# Patient Record
Sex: MALE | Race: WHITE | URBAN - NONMETROPOLITAN AREA
[De-identification: names, ages, dates, MRNs, and addresses within clinical notes are randomized per-mention and may not be internally consistent; named-entity substitution may affect disease eponyms.]

---

## 2017-01-31 ENCOUNTER — HISTORY (OUTPATIENT)
Dept: INTERNAL MEDICINE | Facility: OTHER | Age: 52
End: 2017-01-31

## 2017-01-31 ENCOUNTER — OFFICE VISIT - GICH (OUTPATIENT)
Dept: INTERNAL MEDICINE | Facility: OTHER | Age: 52
End: 2017-01-31

## 2017-01-31 DIAGNOSIS — K21.9 GASTRO-ESOPHAGEAL REFLUX DISEASE WITHOUT ESOPHAGITIS: ICD-10-CM

## 2017-01-31 DIAGNOSIS — J45.20 MILD INTERMITTENT ASTHMA, UNCOMPLICATED: ICD-10-CM

## 2017-01-31 DIAGNOSIS — N28.89 OTHER SPECIFIED DISORDERS OF KIDNEY AND URETER: ICD-10-CM

## 2017-01-31 DIAGNOSIS — S22.42XA MULTIPLE CLOSED FRACTURES OF RIBS OF LEFT SIDE: ICD-10-CM

## 2017-01-31 DIAGNOSIS — F10.10 UNCOMPLICATED ALCOHOL ABUSE: ICD-10-CM

## 2017-01-31 DIAGNOSIS — L98.8 OTHER SPECIFIED DISORDERS OF THE SKIN AND SUBCUTANEOUS TISSUE: ICD-10-CM

## 2017-02-01 ENCOUNTER — HISTORY (OUTPATIENT)
Dept: UROLOGY | Facility: OTHER | Age: 52
End: 2017-02-01

## 2017-02-01 ENCOUNTER — HOSPITAL ENCOUNTER (OUTPATIENT)
Dept: RADIOLOGY | Facility: OTHER | Age: 52
End: 2017-02-01
Attending: UROLOGY

## 2017-02-01 ENCOUNTER — OFFICE VISIT - GICH (OUTPATIENT)
Dept: UROLOGY | Facility: OTHER | Age: 52
End: 2017-02-01

## 2017-02-01 DIAGNOSIS — N32.89 OTHER SPECIFIED DISORDERS OF BLADDER (CODE): ICD-10-CM

## 2017-02-01 DIAGNOSIS — N28.1 ACQUIRED CYST OF KIDNEY: ICD-10-CM

## 2017-02-02 ENCOUNTER — AMBULATORY - GICH (OUTPATIENT)
Dept: UROLOGY | Facility: OTHER | Age: 52
End: 2017-02-02

## 2017-02-02 ENCOUNTER — HISTORY (OUTPATIENT)
Dept: UROLOGY | Facility: OTHER | Age: 52
End: 2017-02-02

## 2017-02-02 DIAGNOSIS — N32.89 OTHER SPECIFIED DISORDERS OF BLADDER (CODE): ICD-10-CM

## 2017-02-09 ENCOUNTER — COMMUNICATION - GICH (OUTPATIENT)
Dept: UROLOGY | Facility: OTHER | Age: 52
End: 2017-02-09

## 2017-03-25 ENCOUNTER — AMBULATORY - GICH (OUTPATIENT)
Dept: FAMILY MEDICINE | Facility: OTHER | Age: 52
End: 2017-03-25

## 2017-03-25 ENCOUNTER — OFFICE VISIT - GICH (OUTPATIENT)
Dept: FAMILY MEDICINE | Facility: OTHER | Age: 52
End: 2017-03-25

## 2017-03-25 ENCOUNTER — HISTORY (OUTPATIENT)
Dept: EMERGENCY MEDICINE | Facility: OTHER | Age: 52
End: 2017-03-25

## 2017-03-25 ENCOUNTER — HOSPITAL ENCOUNTER (OUTPATIENT)
Dept: RADIOLOGY | Facility: OTHER | Age: 52
End: 2017-03-25
Attending: NURSE PRACTITIONER

## 2017-03-25 ENCOUNTER — HISTORY (OUTPATIENT)
Dept: FAMILY MEDICINE | Facility: OTHER | Age: 52
End: 2017-03-25

## 2017-03-25 DIAGNOSIS — M25.511 PAIN IN RIGHT SHOULDER: ICD-10-CM

## 2017-03-25 DIAGNOSIS — M79.89 OTHER SPECIFIED SOFT TISSUE DISORDERS (CODE): ICD-10-CM

## 2017-03-25 DIAGNOSIS — R50.9 FEVER: ICD-10-CM

## 2017-03-25 LAB
ABSOLUTE BASOPHILS - HISTORICAL: 0.2 THOU/CU MM
ABSOLUTE EOSINOPHILS - HISTORICAL: 0.1 THOU/CU MM
ABSOLUTE LYMPHOCYTES - HISTORICAL: 0.5 THOU/CU MM (ref 0.9–2.9)
ABSOLUTE MONOCYTES - HISTORICAL: 0.5 THOU/CU MM
ABSOLUTE NEUTROPHILS - HISTORICAL: 7.1 THOU/CU MM (ref 1.7–7)
BASOPHILS # BLD AUTO: 1.8 %
EOSINOPHIL NFR BLD AUTO: 1.3 %
ERYTHROCYTE [DISTWIDTH] IN BLOOD BY AUTOMATED COUNT: 13.2 % (ref 11.5–15.5)
HCT VFR BLD AUTO: 40.8 % (ref 37–53)
HEMOGLOBIN: 13.9 G/DL (ref 13.5–17.5)
LYMPHOCYTES NFR BLD AUTO: 6.4 % (ref 20–44)
MCH RBC QN AUTO: 31.6 PG (ref 26–34)
MCHC RBC AUTO-ENTMCNC: 34.1 G/DL (ref 32–36)
MCV RBC AUTO: 93 FL (ref 80–100)
MONOCYTES NFR BLD AUTO: 5.6 %
NEUTROPHILS NFR BLD AUTO: 84.9 % (ref 42–72)
PLATELET # BLD AUTO: 307 THOU/CU MM (ref 140–440)
PMV BLD: 6.1 FL (ref 6.5–11)
RED BLOOD COUNT - HISTORICAL: 4.4 MIL/CU MM (ref 4.3–5.9)
WHITE BLOOD COUNT - HISTORICAL: 8.4 THOU/CU MM (ref 4.5–11)

## 2017-04-13 ENCOUNTER — HISTORY (OUTPATIENT)
Dept: EMERGENCY MEDICINE | Facility: OTHER | Age: 52
End: 2017-04-13

## 2017-07-07 ENCOUNTER — COMMUNICATION - GICH (OUTPATIENT)
Dept: INTERNAL MEDICINE | Facility: OTHER | Age: 52
End: 2017-07-07

## 2017-07-07 DIAGNOSIS — N28.1 ACQUIRED CYST OF KIDNEY: ICD-10-CM

## 2017-12-28 NOTE — TELEPHONE ENCOUNTER
Patient Information     Patient Name MRN Gregorio Lamb [id number] Male 1965      Telephone Encounter by Rayna Otero at 2017 10:53 AM     Author:  Rayna Otero Service:  (none) Author Type:  (none)     Filed:  2017 10:55 AM Encounter Date:  2017 Status:  Signed     :  Rayna Otero            Needs orders, Pended below.  Rayna Otero LPN........................2017  10:53 AM
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
## 2018-01-03 NOTE — NURSING NOTE
Patient Information     Patient Name MRN Gregorio Lamb [id number] Male 1965      Nursing Note by Rosa Santoro RN at 2017  8:00 AM     Author:  Rosa Santoro RN Service:  (none) Author Type:  NURS- Registered Nurse     Filed:  2017  8:58 AM Encounter Date:  2017 Status:  Signed     :  Rosa Santoro RN (NURS- Registered Nurse)            Patient positioned in supine position, perineum area prepped with chlorhexidene Gluconate and patient draped per sterile technique. Per verbal order read back by Kyle Hendricks MD, Urojet 10mL 2% lidocaine jelly to be instilled into urethra.  Urojet- 10ml 2% Lidocaine jelly instilled into the urethra.    Urojet 2%  Lot#: [id number]  Expiration date:   : [employer]  NDC: [address]-9    [place] Protocol    A. Pre-procedure verification complete yes  1-relevant information / documentation available, reviewed and properly matched to the patient; 2-consent accurate and complete, 3-equipment and supplies available    B. Site marking complete N/A  Site marked if not in continuous attendance with patient    C. TIME OUT completed yes  Time Out was conducted just prior to starting procedure to verify the eight required elements: 1-patient identity, 2-consent accurate and complete, 3-position, 4-correct side/site marked (if applicable), 5-procedure, 6-relevant images / results properly labeled and displayed (if applicable), 7-antibiotics / irrigation fluids (if applicable), 8-safety precautions.    After procedure perineum area rinsed. Discharge instructions reviewed with patient. Patient verbalized understanding of discharge instructions and discharged ambulatory.
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
## 2018-01-03 NOTE — PROGRESS NOTES
Patient Information     Patient Name MRN Sex Gregorio Pino [id number] Male 1965      Progress Notes by Kyle Hendricks MD at 2017  8:00 AM     Author:  Kyle Hendricks MD Service:  (none) Author Type:  Physician     Filed:  2017 10:50 AM Encounter Date:  2017 Status:  Signed     :  Kyle Hendricks MD (Physician)            Preprocedure diagnosis  Hematuria    Postprocedure diagnosis  Hematuria    Procedure  Flexible Cystourethroscopy    Surgeon  Kyle Hendricks MD    Anesthesia  2% lidocaine jelly intraurethrally    Complications  None    Indications  51 y.o. male undergoing a flexible cystoscopy for the above mentioned indications.    Findings  Cystoscopic findings included a normal anterior urethra.    There was not a prominent median lobe.    The lateral lobes were not obstructive in appearance.  The bladder appeared to be normal capacity.    There were no tumors, stones or foreign bodies.    The orifices were slit-shaped and in their normal location.    Procedure  The patient was placed in supine position and prepped and draped in sterile fashion with lidocaine jelly per urethra for anesthesia.    I passed a lubricated 14F flexible cystoscope through the penile urethra and into the bladder and the bladder was completely visualized.  The cystoscope was retroflexed and the bladder neck and prostate visualized.    The cystoscope was slowly withdrawn while visualizing the urethra and the procedure terminated.    The patient tolerated the procedure well.      Plan  reassurance

## 2018-01-03 NOTE — NURSING NOTE
Patient Information     Patient Name MRN Gregorio Lamb [id number] Male 1965      Nursing Note by Raleigh Willis LPN at 2017 10:00 AM     Author:  Raleigh Willis LPN Service:  (none) Author Type:  NURS- Licensed Practical Nurse     Filed:  2017 10:06 AM Encounter Date:  2017 Status:  Signed     :  Raleigh Willis LPN (NURS- Licensed Practical Nurse)            Patient presents to the clinic for a physical.  Raleigh Willis LPN ..............2017 9:56 AM

## 2018-01-03 NOTE — TELEPHONE ENCOUNTER
Patient Information     Patient Name MRN Gregorio Lamb [id number] Male 1965      Telephone Encounter by Su Goldstein at 2017  1:58 PM     Author:  Su Goldstein Service:  (none) Author Type:  (none)     Filed:  2017  2:00 PM Encounter Date:  2017 Status:  Signed     :  Su Goldstein            Pt asked Candy to call and ask about the Ct results. Per Kyle Hendricks MD Complex cyst and will follow up in 6 months with a CT renal mass protocol. Su Goldstein LPN  2017  1:59 PM

## 2018-01-03 NOTE — PATIENT INSTRUCTIONS
Patient Information     Patient Name MRGregorio Rollins [id number] Male 1965      Patient Instructions by Rosa Santoro RN at 2017  8:00 AM     Author:  Rosa Santoro RN Service:  (none) Author Type:  NURS- Registered Nurse     Filed:  2017  8:11 AM Encounter Date:  2017 Status:  Signed     :  Rosa Santoro RN (NURS- Registered Nurse)            Home Care after Cystoscopy  Follow these guidelines for your care after your procedure.    Activity  No limitations    Bathing or showering  No limitations    Symptoms  You may notice some burning with urination but this usually resolves after 1-2 days.  You may also notice small amounts of blood in your urine.  Please increase water intake for the next few days to help with these symptoms.    Contacts  General Questions: [phone]  Appointments:  [phone]  Emergencies:  [phone]    When to call the clinic  If you develop any of the following symptoms please call the clinic immediately.  If the clinic is closed please be seen at an urgent care clinic or the Emergency Department.  - Burning with urination that worsens after 2 days  - Unable to urinate causing severe pelvic pain  - Fevers of greater than 101 degrees F  - Flank pain that is not responding to pain medication    Follow up  Please follow up as discussed at the appointment.

## 2018-01-03 NOTE — NURSING NOTE
Patient Information     Patient Name MRGregorio Rollins [id number] Male 1965      Nursing Note by Su Goldstein at 2017  9:15 AM     Author:  Su Goldstein Service:  (none) Author Type:  (none)     Filed:  2017  9:29 AM Encounter Date:  2017 Status:  Signed     :  Su Goldstein            Here for left renal mass ans bladder wall thickening.  Review of Systems:    Weight loss:    No     Recent fever/chills:  No   Night sweats:   No  Current skin rash:  No   Recent hair loss:  No  Heat intolerance:  No   Cold intolerance:  No  Chest pain:   No   Palpitations:   No  Shortness of breath:  No   Wheezing:   No  Constipation:    No   Diarrhea:   No   Nausea:   No   Vomiting:   No   Kidney/side pain:  Yes   Back pain:   Yes  Frequent headaches:  No   Dizziness:     No  Leg swelling:   No   Calf pain:    No        Parents, brothers or sisters with history of kidney cancer?   No  Parents, brothers or sisters with history of bladder cancer: No    Su Goldstein LPN  2017  9:25 AM

## 2018-01-04 NOTE — NURSING NOTE
Patient Information     Patient Name MRN Gregorio Lamb [id number] Male 1965      Nursing Note by Maria C Rivas at 3/25/2017 12:15 PM     Author:  Maria C Rivas Service:  (none) Author Type:  (none)     Filed:  3/25/2017  1:20 PM Encounter Date:  3/25/2017 Status:  Signed     :  Maria C Rivas            Gregorio Burgess is a 52 y.o. Male Presenting with swelling in his right hand and pain in his right shoulder. He was in the emergency room last night and had several attempts of an IV placement in that hand.  Maria C Rivas LPN 3/25/2017 12:40 PM

## 2018-01-04 NOTE — PATIENT INSTRUCTIONS
Patient Information     Patient Name MRN Gregorio Lamb [id number] Male 1965      Patient Instructions by Clarisa Owusu at 3/25/2017 12:15 PM     Author:  Clarisa Owusu Service:  (none) Author Type:  PHYS- Nurse Practitioner     Filed:  3/25/2017  3:50 PM Encounter Date:  3/25/2017 Status:  Signed     :  Clarisa Owusu (PHYS- Nurse Practitioner)            We gave you a shot of Toradol 60 mg today. That should help the pain.  The radiologist did not see any damage to your shoulder. I am not seeing any issues with your hand either as far as the bones.     Tomorrow 24 hours after the Toradol - I ordered ibuprofen 600 mg up to 4 times a day but it must be taken with food and lots of fluids.     You can have tylenol 2 tabs up to 4 times per day for fever or chills.     Check your temp every 4-6 hours.     Wear the sling to support your shoulder and keep your hand higher than your elbow.   If you develop numbness or tingling in your hand the ace wrap might be too tight and need to be loosened.     As far as your fever you have a viral illness as your blood count and influenza tests were ok.     -Drink lots of fluids and get plenty of rest.   -Treat symptomatically with warm salt water gargles.    -You can use throat lozenges, frequent swallows of cool/cold liquids to sooth your throat.   -Oatmeal coats the throat and some patients find it soothes the pain.     -Monitor for any fevers or chills.      -Return in 5 days if not feeling better. Return to ER sooner if doing worse.   -Please call clinic with any questions or concerns.   -Return to clinic with change/worsening of symptoms.

## 2018-01-04 NOTE — PROGRESS NOTES
Patient Information     Patient Name MRN Sex Gregorio Pino [id number] Male 1965      Progress Notes by Clarisa Owusu at 3/25/2017 12:15 PM     Author:  Clarisa Owusu Service:  (none) Author Type:  PHYS- Nurse Practitioner     Filed:  3/25/2017  5:09 PM Encounter Date:  3/25/2017 Status:  Signed     :  Clarisa Owusu (PHYS- Nurse Practitioner)            HPI:    Gregorio Burgess is a 52 y.o. male who presents to clinic today for fever, swelling in his right hand and pain in his right shoulder. He was in the emergency room last night for suicidal intention took significant amount of Seroquel. Reports he had taken the Seroquel and then brought himself to [facility] and they brought him to the ER.  Had several attempts of an IV placement in right hand. Pt reports IV was successfully placed in left hand. Right hand is swollen and there is no pain in the hand. Shoulder is quite painful and had difficulty holding up lunch tray.  Denies falling at all yesterday pr recently, denies loss of consciousness at all yesterday. Rates pain in shoulder at 5/10. Has not had any medication. Reports right hand feels a bit numb due to swelling. Pain did not start until this AM, awoke with the pain, thought maybe he slept on it wrong but pain has continued to get worse through out the day. Overall is not feeling great. Is fatigued Denies GI or  symptoms.     Past Medical History      Diagnosis   Date     Alcohol abuse, unspecified  07     At [facility] for IP rxmt starting 07      Asthma       Diverticulitis of colon (without mention of hemorrhage)       GERD (gastroesophageal reflux disease)  1/10/2013     Multiple rib fractures       Substance abuse  2106     Meth on urine screen      Past Surgical History       Procedure   Laterality Date     Femur knee surgery        Neck/thorax procedure        Traction post accident       Hernia repair        during colostomy takedown        Hx perforated diverticulitis        Colostomy       Colostomy take down   2007     Social History        Substance Use Topics          Smoking status:   Former Smoker      Packs/day:  1.00      Types:  Cigarettes      Smokeless tobacco:   Never Used       Comment: quit smoking 11 years ago       Alcohol use   3.0 oz/week     6 Cans of beer per week        Comment: Quit 1/20/2017       Current Outpatient Prescriptions       Medication  Sig Dispense Refill     acetaminophen (TYLENOL EXTRA STRGTH) 500 mg tablet Take 2 tablets by mouth 4 times daily. Max acetaminophen dose: 4000mg in 24 hrs. 60 tablet 0     albuterol HFA (VENTOLIN HFA) 90 mcg/actuation inhaler Inhale 2 Puffs by mouth 4 times daily if needed.  0     ARIPiprazole (ABILIFY) 5 mg tablet Take 1 tablet by mouth every morning.  0     aspirin chewable 81 mg chewable tablet Take 1 tablet by mouth once daily with a meal.  0     budesonide-formoterol (SYMBICORT) 160-4.5 mcg/actuation (160-4.5 mcg each actuation) inhaler Inhale 2 Puffs by mouth 2 times daily. 1 Inhaler 0     buPROPion (WELLBUTRIN XL) 300 mg Extended-Release tablet Take 1 tablet by mouth every morning.  0     hydrOXYzine HCl (ATARAX) 25 mg tablet Take 1 tablet by mouth every 8 hours if needed.  0     mirtazapine (REMERON) 30 mg tablet Take 30 mg by mouth at bedtime.  1     naproxen (NAPROSYN) 500 mg tablet Take 1 tablet by mouth 2 times daily with meals.  0     omeprazole (PRILOSEC) 20 mg Delayed-Release capsule Take 1 capsule by mouth once daily before a meal.  0     QUEtiapine (SEROQUEL) 100 mg tablet TAKE 1 2 TO 1 TABLET BY MOUTH NIGHTLY AT BEDTIME AS NEEDED FOR SLEEP  1     thiamine (VITAMIN B1) 100 mg tablet Take 1 tablet by mouth once daily.  0     No current facility-administered medications for this visit.      Medications have been reviewed by me and are current to the best of my knowledge and ability.    Allergies      Allergen   Reactions     Codeine  Rash, Dizziness and Angioedema      Hayfever [Homeopathic Products]       Itchy eyes, runny nose      Hydrocodone-Acetaminophen  Itching, Nausea And Vomiting and Dizziness     Morphine  Hives, Itching and Angioedema     swelling        ROS:  Refer to HPI, otherwise negative.    EXAM:  General appearance: ill appearing man, in mild distress  Head: normocephalic, atraumatic  Ears: TM's with cone of light, no erythema, canals clear bilaterally  Eyes: conjunctivae normal  Orophayrnx: moist mucous membranes, tonsils without erythema, no exudates or petechiae, no post nasal drip seen  Neck: supple without adenopathy  Respiratory: clear to auscultation bilaterally  Cardiac: RRR with no murmurs, radial and brachial pulses intact and equal bilaterally, capillary refill approx 2 seconds but also equal bilaterally.   Musculoskeletal: Right shoulder tender at area of acromium process, no deformity, redness or warmth. Right humerous without overt or palpable deformity or tenderness.   ROM limited due to pain, no palpable crepitus.  Right had with moderate swelling of just right hand and fingers, wrist with minimal swelling. Hand is cool, non erythremic.   Dermatological: no rashes or lesions  Psychological: normal affect, alert and pleasant  XRay: Right shoulder grossly intact, requested reading by radiologist and found to be without trauma or acute injury. Hand without overt fractures or dislocation. Will await radiologist reading.     ASSESSMENT/PLAN:    ICD-10-CM    1. Acute pain of right shoulder M25.511 ketorolac 60 mg injection (TORADOL)      ibuprofen (ADVIL; MOTRIN) 600 mg tablet      DISCONTINUED: Arm Brace 1 unit misc      CANCELED: XR SHOULDER 2 VIEWS RIGHT   2. Swelling of right hand M79.89 CBC AND DIFFERENTIAL      CBC AND DIFFERENTIAL      CBC WITH AUTO DIFFERENTIAL      ibuprofen (ADVIL; MOTRIN) 600 mg tablet      SLING      CANCELED: XR HAND 2 VIEWS RIGHT   3. Fever, unspecified fever cause R50.9 [facility] ONLY INFLUENZA A/B PCR      [facility] GI ONLY  INFLUENZA A/B PCR       Plan: Predominant complaint is right shoulder pain, and non painful swelling in right hand in absence of trauma. Also feeling somewhat under the weather.   Discussed with pt and agrees to X Ray of shoulder and hand on right. Also agrees to CBC and influenza testing as is febrile.  XRay: Right shoulder grossly intact, requested reading by radiologist and found to be without trauma or acute injury. Hand without overt fractures or dislocation. Will await radiologist reading.   CBC WNL and Influenza testing negative.   Rx with Toradol for pain, clinic performed. Ibuprofen 600 mg QID ordered to start tomorrow, 24 hours afterToradol.   Instructed may take tylenol 2 tabs up to 4 times per day for fevers and pain as well.  Ace wrap to hand and sling to keep hand elevated and support shoulder.   Instructed to follow up with PCP, if worsens should return to ER.     See patient instructions. Pt in agreements with plan.  Discussed expected course, Signs and symptoms to return to PCP.   No further questions.       Patient Instructions   We gave you a shot of Toradol 60 mg today. That should help the pain.  The radiologist did not see any damage to your shoulder. I am not seeing any issues with your hand either as far as the bones.     Tomorrow 24 hours after the Toradol - I ordered ibuprofen 600 mg up to 4 times a day but it must be taken with food and lots of fluids.     You can have tylenol 2 tabs up to 4 times per day for fever or chills.     Check your temp every 4-6 hours.     Wear the sling to support your shoulder and keep your hand higher than your elbow.   If you develop numbness or tingling in your hand the ace wrap might be too tight and need to be loosened.     As far as your fever you have a viral illness as your blood count and influenza tests were ok.     -Drink lots of fluids and get plenty of rest.   -Treat symptomatically with warm salt water gargles.    -You can use throat lozenges,  frequent swallows of cool/cold liquids to sooth your throat.   -Oatmeal coats the throat and some patients find it soothes the pain.     -Monitor for any fevers or chills.      -Return in 5 days if not feeling better. Return to ER sooner if doing worse.   -Please call clinic with any questions or concerns.   -Return to clinic with change/worsening of symptoms.

## 2018-01-27 VITALS
SYSTOLIC BLOOD PRESSURE: 130 MMHG | BODY MASS INDEX: 29.22 KG/M2 | DIASTOLIC BLOOD PRESSURE: 70 MMHG | HEIGHT: 67 IN | HEART RATE: 96 BPM | WEIGHT: 186.2 LBS | TEMPERATURE: 98.3 F

## 2018-01-27 VITALS — HEART RATE: 80 BPM | WEIGHT: 186.2 LBS | BODY MASS INDEX: 29.22 KG/M2 | RESPIRATION RATE: 20 BRPM | HEIGHT: 67 IN

## 2018-01-27 VITALS
BODY MASS INDEX: 29.91 KG/M2 | HEART RATE: 128 BPM | TEMPERATURE: 101.9 F | SYSTOLIC BLOOD PRESSURE: 128 MMHG | WEIGHT: 191 LBS | DIASTOLIC BLOOD PRESSURE: 62 MMHG

## 2018-01-27 VITALS
DIASTOLIC BLOOD PRESSURE: 60 MMHG | BODY MASS INDEX: 29.18 KG/M2 | RESPIRATION RATE: 16 BRPM | SYSTOLIC BLOOD PRESSURE: 120 MMHG | WEIGHT: 186.29 LBS

## 2018-02-01 ENCOUNTER — DOCUMENTATION ONLY (OUTPATIENT)
Dept: FAMILY MEDICINE | Facility: OTHER | Age: 53
End: 2018-02-01

## 2018-02-01 PROBLEM — N28.1 RENAL CYST, LEFT: Status: ACTIVE | Noted: 2017-02-01

## 2018-02-01 PROBLEM — F10.920 ALCOHOLIC INTOXICATION WITHOUT COMPLICATION (H): Status: ACTIVE | Noted: 2017-05-19

## 2018-02-01 PROBLEM — R29.898 RIGHT ARM WEAKNESS: Status: ACTIVE | Noted: 2017-10-20

## 2023-11-14 ENCOUNTER — LAB REQUISITION (OUTPATIENT)
Dept: LAB | Facility: CLINIC | Age: 58
End: 2023-11-14
Payer: COMMERCIAL

## 2023-11-14 DIAGNOSIS — G62.1 ALCOHOLIC POLYNEUROPATHY (H): ICD-10-CM

## 2023-11-15 LAB
ALBUMIN SERPL BCG-MCNC: 3.8 G/DL (ref 3.5–5.2)
ALP SERPL-CCNC: 56 U/L (ref 40–150)
ALT SERPL W P-5'-P-CCNC: 58 U/L (ref 0–70)
ANION GAP SERPL CALCULATED.3IONS-SCNC: 14 MMOL/L (ref 7–15)
AST SERPL W P-5'-P-CCNC: 54 U/L (ref 0–45)
BILIRUB SERPL-MCNC: 0.3 MG/DL
BUN SERPL-MCNC: 10.7 MG/DL (ref 8–23)
CALCIUM SERPL-MCNC: 9.5 MG/DL (ref 8.6–10)
CHLORIDE SERPL-SCNC: 104 MMOL/L (ref 98–107)
CREAT SERPL-MCNC: 0.69 MG/DL (ref 0.67–1.17)
DEPRECATED HCO3 PLAS-SCNC: 20 MMOL/L (ref 22–29)
EGFRCR SERPLBLD CKD-EPI 2021: >90 ML/MIN/1.73M2
ERYTHROCYTE [DISTWIDTH] IN BLOOD BY AUTOMATED COUNT: 12.2 % (ref 10–15)
GLUCOSE SERPL-MCNC: 145 MG/DL (ref 70–99)
HCT VFR BLD AUTO: 41.4 % (ref 40–53)
HGB BLD-MCNC: 13.3 G/DL (ref 13.3–17.7)
MCH RBC QN AUTO: 32.7 PG (ref 26.5–33)
MCHC RBC AUTO-ENTMCNC: 32.1 G/DL (ref 31.5–36.5)
MCV RBC AUTO: 102 FL (ref 78–100)
PLATELET # BLD AUTO: 330 10E3/UL (ref 150–450)
POTASSIUM SERPL-SCNC: 4.1 MMOL/L (ref 3.4–5.3)
PROT SERPL-MCNC: 7.4 G/DL (ref 6.4–8.3)
RBC # BLD AUTO: 4.07 10E6/UL (ref 4.4–5.9)
SODIUM SERPL-SCNC: 138 MMOL/L (ref 135–145)
WBC # BLD AUTO: 8.2 10E3/UL (ref 4–11)

## 2023-11-15 PROCEDURE — P9604 ONE-WAY ALLOW PRORATED TRIP: HCPCS | Mod: ORL | Performed by: REGISTERED NURSE

## 2023-11-15 PROCEDURE — 80053 COMPREHEN METABOLIC PANEL: CPT | Mod: ORL | Performed by: REGISTERED NURSE

## 2023-11-15 PROCEDURE — 36415 COLL VENOUS BLD VENIPUNCTURE: CPT | Mod: ORL | Performed by: REGISTERED NURSE

## 2023-11-15 PROCEDURE — 85027 COMPLETE CBC AUTOMATED: CPT | Mod: ORL | Performed by: REGISTERED NURSE
